# Patient Record
Sex: FEMALE | Race: WHITE | HISPANIC OR LATINO
[De-identification: names, ages, dates, MRNs, and addresses within clinical notes are randomized per-mention and may not be internally consistent; named-entity substitution may affect disease eponyms.]

---

## 2021-01-20 PROBLEM — Z00.00 ENCOUNTER FOR PREVENTIVE HEALTH EXAMINATION: Status: ACTIVE | Noted: 2021-01-20

## 2021-01-21 ENCOUNTER — APPOINTMENT (OUTPATIENT)
Dept: PULMONOLOGY | Facility: CLINIC | Age: 71
End: 2021-01-21

## 2021-01-29 ENCOUNTER — APPOINTMENT (OUTPATIENT)
Dept: PULMONOLOGY | Facility: CLINIC | Age: 71
End: 2021-01-29

## 2023-01-30 ENCOUNTER — APPOINTMENT (OUTPATIENT)
Dept: RHEUMATOLOGY | Facility: CLINIC | Age: 73
End: 2023-01-30

## 2023-03-03 ENCOUNTER — LABORATORY RESULT (OUTPATIENT)
Age: 73
End: 2023-03-03

## 2023-03-03 ENCOUNTER — NON-APPOINTMENT (OUTPATIENT)
Age: 73
End: 2023-03-03

## 2023-03-03 ENCOUNTER — APPOINTMENT (OUTPATIENT)
Dept: RHEUMATOLOGY | Facility: CLINIC | Age: 73
End: 2023-03-03
Payer: MEDICARE

## 2023-03-03 VITALS
BODY MASS INDEX: 29.26 KG/M2 | TEMPERATURE: 97.3 F | HEART RATE: 97 BPM | HEIGHT: 62 IN | SYSTOLIC BLOOD PRESSURE: 130 MMHG | DIASTOLIC BLOOD PRESSURE: 75 MMHG | WEIGHT: 159 LBS | OXYGEN SATURATION: 95 %

## 2023-03-03 DIAGNOSIS — F32.A DEPRESSION, UNSPECIFIED: ICD-10-CM

## 2023-03-03 DIAGNOSIS — M54.12 RADICULOPATHY, CERVICAL REGION: ICD-10-CM

## 2023-03-03 PROCEDURE — 99204 OFFICE O/P NEW MOD 45 MIN: CPT | Mod: 25

## 2023-03-03 NOTE — ASSESSMENT
[FreeTextEntry1] : 73 y/o female referred to rheumatology for joint pains.\par Pt reports pains in the arms, fingers, legs, knees. Pt was seen by orthopedics and had XR knees. Pt was given PDN. Pt reports fingertip spasms.\par Pt tried gabapentin by PCP which did not help. Pt is losing  in hands.\par Pt has known cervical and lumbar radiculopathy from 5-6 years ago for which she had surgeries in C-spine and L-spine. Pt was told she needed further surgeries on neck and back but pt deferred.\par Pt is on duloxetine 30mg/day this week for chronic pain and depression.\par Pt referred to rheumatology for evaluation for polyarthralgia.\par \par Pt has polyarthralgia which by history and exam is likely combination of arthritis, compressive neuropathy, tendinitis. I do not have much concern for inflammatory arthritis. Pt needs work up to rule out signs of autoimmune rheum diseases, but if pt's symptoms are mostly mechanical, pt should be followed by orthopedics, neurology, pain management for treatments including medications, therapies, injections, surgeries. Pt should be appropriately evaluated for these physical causes of pains prior to diagnosis of fibromyalgia, but pt is already being treated with duloxetine 30mg/day by psychiatry for chronic pain and depression.\par \par - Obtain labwork to evaluate for signs of inflammatory arthritis\par - Obtain XR b/l hands, shoulders, C-spine, L-spine\par - RTC 1 month. If no signs of underlying rheumatologic disease causing pt's pains, pt should follow up with other specialists listed above for further management of pt's pains\par

## 2023-03-03 NOTE — HISTORY OF PRESENT ILLNESS
[FreeTextEntry1] : 71 y/o female referred to rheumatology for joint pains.\par Pt reports pains in the arms, fingers, legs, knees. Pt was seen by orthopedics and had XR knees. Pt was given PDN. Pt reports fingertip spasms.\par Pt tried gabapentin by PCP which did not help. Pt is losing  in hands.\par Pt has known cervical and lumbar radiculopathy from 5-6 years ago for which she had surgeries in C-spine and L-spine. Pt was told she needed further surgeries on neck and back but pt deferred.\par Pt is on duloxetine 30mg/day this week for chronic pain and depression.\par Pt referred to rheumatology for evaluation for polyarthralgia.\par

## 2023-03-04 LAB
ALBUMIN SERPL ELPH-MCNC: 3.8 G/DL
ALP BLD-CCNC: 89 U/L
ALT SERPL-CCNC: 15 U/L
ANION GAP SERPL CALC-SCNC: 12 MMOL/L
AST SERPL-CCNC: 19 U/L
BASOPHILS # BLD AUTO: 0 K/UL
BASOPHILS NFR BLD AUTO: 0 %
BILIRUB SERPL-MCNC: 1 MG/DL
BUN SERPL-MCNC: 10 MG/DL
CALCIUM SERPL-MCNC: 9.4 MG/DL
CHLORIDE SERPL-SCNC: 105 MMOL/L
CO2 SERPL-SCNC: 24 MMOL/L
CREAT SERPL-MCNC: 0.77 MG/DL
CRP SERPL-MCNC: 9 MG/L
EGFR: 82 ML/MIN/1.73M2
EOSINOPHIL # BLD AUTO: 0 K/UL
EOSINOPHIL NFR BLD AUTO: 0 %
ERYTHROCYTE [SEDIMENTATION RATE] IN BLOOD BY WESTERGREN METHOD: 61 MM/HR
GLUCOSE SERPL-MCNC: 132 MG/DL
HCT VFR BLD CALC: 38.6 %
HGB BLD-MCNC: 11.9 G/DL
LYMPHOCYTES # BLD AUTO: 2.22 K/UL
LYMPHOCYTES NFR BLD AUTO: 17.4 %
MAN DIFF?: NORMAL
MCHC RBC-ENTMCNC: 20.3 PG
MCHC RBC-ENTMCNC: 30.8 GM/DL
MCV RBC AUTO: 65.8 FL
MONOCYTES # BLD AUTO: 0.89 K/UL
MONOCYTES NFR BLD AUTO: 7 %
NEUTROPHILS # BLD AUTO: 9.65 K/UL
NEUTROPHILS NFR BLD AUTO: 75.6 %
PLATELET # BLD AUTO: 412 K/UL
POTASSIUM SERPL-SCNC: 4.5 MMOL/L
PROT SERPL-MCNC: 6.8 G/DL
RBC # BLD: 5.87 M/UL
RBC # FLD: 17.2 %
RHEUMATOID FACT SER QL: <10 IU/ML
SODIUM SERPL-SCNC: 141 MMOL/L
WBC # FLD AUTO: 12.76 K/UL

## 2023-03-05 LAB
CCP AB SER IA-ACNC: <8 UNITS
RF+CCP IGG SER-IMP: NEGATIVE

## 2023-03-17 ENCOUNTER — NON-APPOINTMENT (OUTPATIENT)
Age: 73
End: 2023-03-17

## 2023-03-17 LAB — 14-3-3 ETA AG SER IA-MCNC: 0.27 NG/ML

## 2023-04-28 ENCOUNTER — APPOINTMENT (OUTPATIENT)
Dept: RHEUMATOLOGY | Facility: CLINIC | Age: 73
End: 2023-04-28
Payer: MEDICARE

## 2023-04-28 VITALS
TEMPERATURE: 97.7 F | SYSTOLIC BLOOD PRESSURE: 171 MMHG | WEIGHT: 159 LBS | DIASTOLIC BLOOD PRESSURE: 83 MMHG | BODY MASS INDEX: 29.26 KG/M2 | HEART RATE: 94 BPM | OXYGEN SATURATION: 96 % | HEIGHT: 62 IN

## 2023-04-28 PROCEDURE — 99214 OFFICE O/P EST MOD 30 MIN: CPT

## 2023-04-28 RX ORDER — HYDROXYCHLOROQUINE SULFATE 200 MG/1
200 TABLET, FILM COATED ORAL
Qty: 135 | Refills: 1 | Status: ACTIVE | COMMUNITY
Start: 2023-04-28 | End: 1900-01-01

## 2023-04-28 NOTE — ASSESSMENT
[FreeTextEntry1] : 73 y/o female presents as follow up for joint pains. \par Pt reports pains in the arms, fingers, legs, knees. Pt was seen by orthopedics and had XR knees. Pt was given PDN. Pt reports fingertip spasms. \par Pt tried gabapentin by PCP which did not help. Pt is losing  in hands. \par Pt has known cervical and lumbar radiculopathy from 5-6 years ago for which she had surgeries in C-spine and L-spine. Pt was told she needed further surgeries on neck and back but pt deferred. \par Pt is on duloxetine 30mg/day this week for chronic pain and depression. \par Pt referred to rheumatology for evaluation for polyarthralgia.\par \par Pt's symptoms mechanical in nature and likely with component of compressive neuropathy. Labwork by me showed  negative RA markers, but elevated inflammatory markers (including 14.3.3 eta protein which has been associated with RA), high WBC, and very low MCV. The labwork does increase concern for potential seronegative RA. XRs with diffuse OA, L shoulder calcific tendinitis/bursitis.\par Pt has been on meloxicam by ortho with significant relief but needs to take every day.\par \par Pt needs further workup for other signs of rheumatoid arthritis. I discussed with pt about starting treatment for possible seronegative RA while undergoing further workup.\par \par - Obtain XR b/l knees\par - Obtain US of b/l hands to evaluate for signs of inflammatory arthritis\par - Will consider microcytic anemia workup in future given severe low MCV\par - Rx HCQ 300mg PO daily. HCQ dosage is <5mg/kg/day or <400mg/day to minimize risk of retinal toxicity.\par Side effects of HCQ were discussed with the patient including but not limited to GI upset, retinal toxicity, QT prolongation, cytopenias, myopathy. Discussed the importance of yearly ophthalmology evaluation.\par - Will refer to ophthalmology on next visit if we decide to continue HCQ chronically\par - c/w meloxicam 15mg PO daily. Advise that we will try to minimize meloxicam use in future to minimize risk of side effects. I advised that the NSAID should be taken with food.  In addition while taking the prescribed NSAID, no over the counter or other NSAIDs should be used, such as ibuprofen (Motrin or Advil) or naproxen (Aleve) as this can cause stomach upset or other side effects.  If needed for fever or breakthrough pain Tylenol can be used.\par  - RTC 2 months for follow up\par

## 2023-04-28 NOTE — HISTORY OF PRESENT ILLNESS
[FreeTextEntry1] : HISTORY: \par 73 y/o female presents as follow up for joint pains. \par Pt reports pains in the arms, fingers, legs, knees. Pt was seen by orthopedics and had XR knees. Pt was given PDN. Pt reports fingertip spasms. \par Pt tried gabapentin by PCP which did not help. Pt is losing  in hands. \par Pt has known cervical and lumbar radiculopathy from 5-6 years ago for which she had surgeries in C-spine and L-spine. Pt was told she needed further surgeries on neck and back but pt deferred. \par Pt is on duloxetine 30mg/day this week for chronic pain and depression. \par Pt referred to rheumatology for evaluation for polyarthralgia. \par \par INTERVAL HISTORY: \par Pt states meloxicam PRN that she was prescribed by ortho greatly helped with her pains but pain comes back significantly when off. Worst pains in hands and knees.\par \par WORKUP: \par Remarkable for (3/2023): WBC 12.76, MCV 65.8, Plt 412, ESR 61, CRP 9, 14.3.3 eta protein 0.27 (nml <0.20) \par Normal/neg (3/2023): RF, CCP \par XR b/l hands (3/2023): Mild diffuse b/l IP OA changes \par XR b/l shoulders (3/2023): Mod R and mild L AC degenerative chagnes. Mild L calcific tendinitis/bursitis \par XR b/l C-spine (3/2023): s/p anterior cervical discectomy and fusion. Disc space narrowing at C3-C4 and C4-C5. Multilevel facet arthropathy. \par XR b/l L-spine (3/2023): Degenerative changes with disc space narrowing at L4-L5 and L5-S1\par

## 2023-06-13 ENCOUNTER — APPOINTMENT (OUTPATIENT)
Dept: RADIOLOGY | Facility: CLINIC | Age: 73
End: 2023-06-13

## 2023-06-13 ENCOUNTER — APPOINTMENT (OUTPATIENT)
Dept: ULTRASOUND IMAGING | Facility: CLINIC | Age: 73
End: 2023-06-13
Payer: MEDICARE

## 2023-06-13 ENCOUNTER — RESULT REVIEW (OUTPATIENT)
Age: 73
End: 2023-06-13

## 2023-06-13 PROCEDURE — 76881 US COMPL JOINT R-T W/IMG: CPT | Mod: RT

## 2023-06-13 PROCEDURE — 73564 X-RAY EXAM KNEE 4 OR MORE: CPT | Mod: 50

## 2023-06-27 ENCOUNTER — APPOINTMENT (OUTPATIENT)
Dept: RHEUMATOLOGY | Facility: CLINIC | Age: 73
End: 2023-06-27

## 2023-07-05 ENCOUNTER — APPOINTMENT (OUTPATIENT)
Dept: RHEUMATOLOGY | Facility: CLINIC | Age: 73
End: 2023-07-05
Payer: MEDICARE

## 2023-07-05 VITALS
HEIGHT: 62 IN | HEART RATE: 73 BPM | SYSTOLIC BLOOD PRESSURE: 150 MMHG | TEMPERATURE: 98.1 F | DIASTOLIC BLOOD PRESSURE: 76 MMHG | WEIGHT: 159 LBS | OXYGEN SATURATION: 99 % | BODY MASS INDEX: 29.26 KG/M2

## 2023-07-05 DIAGNOSIS — M25.50 PAIN IN UNSPECIFIED JOINT: ICD-10-CM

## 2023-07-05 DIAGNOSIS — M06.9 RHEUMATOID ARTHRITIS, UNSPECIFIED: ICD-10-CM

## 2023-07-05 DIAGNOSIS — M54.16 RADICULOPATHY, LUMBAR REGION: ICD-10-CM

## 2023-07-05 DIAGNOSIS — Z79.899 OTHER LONG TERM (CURRENT) DRUG THERAPY: ICD-10-CM

## 2023-07-05 PROCEDURE — 99214 OFFICE O/P EST MOD 30 MIN: CPT

## 2023-07-05 NOTE — HISTORY OF PRESENT ILLNESS
[FreeTextEntry1] : HISTORY: \par 71 y/o female presents as follow up for joint pains.  \par Pt reports pains in the arms, fingers, legs, knees. Pt was seen by orthopedics and had XR knees. Pt was given PDN. Pt reports fingertip spasms.  \par Pt tried gabapentin by PCP which did not help. Pt is losing  in hands.  \par Pt has known cervical and lumbar radiculopathy from 5-6 years ago for which she had surgeries in C-spine and L-spine. Pt was told she needed further surgeries on neck and back but pt deferred.  \par Pt is on duloxetine 30mg/day this week for chronic pain and depression.  \par Pt referred to rheumatology for evaluation for polyarthralgia. \par \par Pt's symptoms mechanical in nature and likely with component of compressive neuropathy. Labwork by me showed negative RA markers, but elevated inflammatory markers (including 14.3.3 eta protein which has been associated with RA), high WBC, and very low MCV. The labwork does increase concern for potential seronegative RA. XRs with diffuse OA, L shoulder calcific tendinitis/bursitis. \par Pt has been on meloxicam by ortho with significant relief but needs to take every day. \par \par INTERVAL HISTORY: \par Pt reports that HCQ since last visit and reports ~60% improvement in hand pains and decreased use of meloxicam use as needed. Denies any HCQ side effects.\par \par WORKUP:  \par Remarkable for (3/2023): WBC 12.76, MCV 65.8, Plt 412, ESR 61, CRP 9, 14.3.3 eta protein 0.27 (nml <0.20)  \par Normal/neg (3/2023): RF, CCP  \par XR b/l hands (3/2023): Mild diffuse b/l IP OA changes  \par XR b/l shoulders (3/2023): Mod R and mild L AC degenerative chagnes. Mild L calcific tendinitis/bursitis \par XR b/l knees (6/2023): Tiny b/l knee joint margin osteophytes. \par XR b/l C-spine (3/2023): s/p anterior cervical discectomy and fusion. Disc space narrowing at C3-C4 and C4-C5. Multilevel facet arthropathy.  \par XR b/l L-spine (3/2023): Degenerative changes with disc space narrowing at L4-L5 and L5-S1 \par US b/l hands/wrists (6/2023): Small midcarpal and carpometacarpal joint effusions involving R wrist without increased vascularity\par

## 2023-07-05 NOTE — ASSESSMENT
[FreeTextEntry1] : 71 y/o female presents as follow up for seronegative RA.\par Pt reports pains in the arms, fingers, legs, knees. Pt was seen by orthopedics and had XR knees. Pt was given PDN. Pt reports fingertip spasms.  \par Pt tried gabapentin by PCP which did not help. Pt is losing  in hands.  \par Pt has known cervical and lumbar radiculopathy from 5-6 years ago for which she had surgeries in C-spine and L-spine. Pt was told she needed further surgeries on neck and back but pt deferred.  \par Pt is on duloxetine 30mg/day this week for chronic pain and depression.  \par Pt referred to rheumatology for evaluation for polyarthralgia. \par \par Pt's symptoms initially deemed to be mechanical in nature and likely with component of compressive neuropathy. Labwork by me showed negative RA markers, but elevated inflammatory markers (including 14.3.3 eta protein which has been associated with RA), high WBC, and very low MCV. XRs with diffuse OA, L shoulder calcific tendinitis/bursitis. US hands/wrists with small effusion of L wrist.\par Pt' back and knee pains more likely OA while hand pains are more RA related.\par \par Pt is diagnosed with seronegative RA by me. Pt has been on meloxicam by ortho with significant relief but needs to take every day. Pt was started on HCQ by me in 4/2023 with significant improvement in her joint pains (~60%) with much decreased use of meloxicam.\par \par - c/w HCQ 300mg PO daily. HCQ dosage is <5mg/kg/day or <400mg/day to minimize risk of retinal toxicity. \par Side effects of HCQ were discussed with the patient including but not limited to GI upset, retinal toxicity, QT prolongation, cytopenias, myopathy. Discussed the importance of yearly ophthalmology evaluation. \par - Refer to ophthalmology for long term HCQ use screening\par - Will discuss any change in treatment depending on her response to longer term HCQ and frequency of use of meloxicam on next visit.\par - c/w meloxicam 15mg PO daily PRN. Advise that we will try to minimize meloxicam use in future to minimize risk of side effects. I advised that the NSAID should be taken with food. In addition while taking the prescribed NSAID, no over the counter or other NSAIDs should be used, such as ibuprofen (Motrin or Advil) or naproxen (Aleve) as this can cause stomach upset or other side effects. If needed for fever or breakthrough pain Tylenol can be used. \par - RTC 3 months for follow up \par

## 2023-08-21 ENCOUNTER — APPOINTMENT (OUTPATIENT)
Dept: OPHTHALMOLOGY | Facility: CLINIC | Age: 73
End: 2023-08-21

## 2023-10-11 ENCOUNTER — APPOINTMENT (OUTPATIENT)
Dept: RHEUMATOLOGY | Facility: CLINIC | Age: 73
End: 2023-10-11

## 2023-10-21 ENCOUNTER — RX RENEWAL (OUTPATIENT)
Age: 73
End: 2023-10-21

## 2023-10-21 RX ORDER — MELOXICAM 15 MG/1
15 TABLET ORAL
Qty: 90 | Refills: 0 | Status: ACTIVE | COMMUNITY
Start: 2023-04-28 | End: 1900-01-01